# Patient Record
Sex: FEMALE | HISPANIC OR LATINO | Employment: FULL TIME | URBAN - METROPOLITAN AREA
[De-identification: names, ages, dates, MRNs, and addresses within clinical notes are randomized per-mention and may not be internally consistent; named-entity substitution may affect disease eponyms.]

---

## 2017-04-07 ENCOUNTER — HOSPITAL ENCOUNTER (OUTPATIENT)
Dept: LAB | Facility: MEDICAL CENTER | Age: 27
End: 2017-04-07
Attending: INTERNAL MEDICINE
Payer: COMMERCIAL

## 2017-04-07 ENCOUNTER — OFFICE VISIT (OUTPATIENT)
Dept: MEDICAL GROUP | Facility: PHYSICIAN GROUP | Age: 27
End: 2017-04-07
Payer: COMMERCIAL

## 2017-04-07 VITALS
RESPIRATION RATE: 16 BRPM | OXYGEN SATURATION: 96 % | HEART RATE: 72 BPM | HEIGHT: 62 IN | BODY MASS INDEX: 23.55 KG/M2 | DIASTOLIC BLOOD PRESSURE: 62 MMHG | WEIGHT: 128 LBS | TEMPERATURE: 99 F | SYSTOLIC BLOOD PRESSURE: 98 MMHG

## 2017-04-07 DIAGNOSIS — J06.9 URI, ACUTE: ICD-10-CM

## 2017-04-07 DIAGNOSIS — Z23 NEED FOR TETANUS BOOSTER: ICD-10-CM

## 2017-04-07 DIAGNOSIS — E55.9 VITAMIN D DEFICIENCY: ICD-10-CM

## 2017-04-07 DIAGNOSIS — Z00.00 ROUTINE HEALTH MAINTENANCE: ICD-10-CM

## 2017-04-07 DIAGNOSIS — J30.2 SEASONAL ALLERGIC RHINITIS, UNSPECIFIED ALLERGIC RHINITIS TRIGGER: ICD-10-CM

## 2017-04-07 DIAGNOSIS — Z78.9 USES CONTRACEPTION: ICD-10-CM

## 2017-04-07 LAB
25(OH)D3 SERPL-MCNC: 16 NG/ML (ref 30–100)
ALBUMIN SERPL BCP-MCNC: 3.9 G/DL (ref 3.2–4.9)
ALBUMIN/GLOB SERPL: 1.1 G/DL
ALP SERPL-CCNC: 90 U/L (ref 30–99)
ALT SERPL-CCNC: 10 U/L (ref 2–50)
ANION GAP SERPL CALC-SCNC: 6 MMOL/L (ref 0–11.9)
AST SERPL-CCNC: 13 U/L (ref 12–45)
BILIRUB SERPL-MCNC: 0.4 MG/DL (ref 0.1–1.5)
BUN SERPL-MCNC: 11 MG/DL (ref 8–22)
CALCIUM SERPL-MCNC: 9.3 MG/DL (ref 8.5–10.5)
CHLORIDE SERPL-SCNC: 103 MMOL/L (ref 96–112)
CHOLEST SERPL-MCNC: 181 MG/DL (ref 100–199)
CO2 SERPL-SCNC: 26 MMOL/L (ref 20–33)
CREAT SERPL-MCNC: 0.7 MG/DL (ref 0.5–1.4)
GFR SERPL CREATININE-BSD FRML MDRD: >60 ML/MIN/1.73 M 2
GLOBULIN SER CALC-MCNC: 3.4 G/DL (ref 1.9–3.5)
GLUCOSE SERPL-MCNC: 96 MG/DL (ref 65–99)
HDLC SERPL-MCNC: 52 MG/DL
LDLC SERPL CALC-MCNC: 90 MG/DL
POTASSIUM SERPL-SCNC: 3.9 MMOL/L (ref 3.6–5.5)
PROT SERPL-MCNC: 7.3 G/DL (ref 6–8.2)
SODIUM SERPL-SCNC: 135 MMOL/L (ref 135–145)
TRIGL SERPL-MCNC: 196 MG/DL (ref 0–149)

## 2017-04-07 PROCEDURE — 36415 COLL VENOUS BLD VENIPUNCTURE: CPT

## 2017-04-07 PROCEDURE — 90471 IMMUNIZATION ADMIN: CPT | Performed by: INTERNAL MEDICINE

## 2017-04-07 PROCEDURE — 99204 OFFICE O/P NEW MOD 45 MIN: CPT | Mod: 25 | Performed by: INTERNAL MEDICINE

## 2017-04-07 PROCEDURE — 80053 COMPREHEN METABOLIC PANEL: CPT

## 2017-04-07 PROCEDURE — 82306 VITAMIN D 25 HYDROXY: CPT

## 2017-04-07 PROCEDURE — 90715 TDAP VACCINE 7 YRS/> IM: CPT | Performed by: INTERNAL MEDICINE

## 2017-04-07 PROCEDURE — 80061 LIPID PANEL: CPT

## 2017-04-07 RX ORDER — CETIRIZINE HYDROCHLORIDE 10 MG/1
10 TABLET ORAL PRN
Status: ON HOLD | COMMUNITY
End: 2023-10-10

## 2017-04-07 ASSESSMENT — PATIENT HEALTH QUESTIONNAIRE - PHQ9: CLINICAL INTERPRETATION OF PHQ2 SCORE: 0

## 2017-04-07 NOTE — ASSESSMENT & PLAN NOTE
Lab Results   Component Value Date/Time    25-HYDROXY   VITAMIN D 25 27* 09/13/2014 12:55 PM    currently taking a multivitamin.

## 2017-04-07 NOTE — MR AVS SNAPSHOT
"        Henrietta Pepe   2017 8:05 AM   Office Visit   MRN: [id number]    Department:  [facility]   Dept Phone:  [phone]    Description:  Female : 1990   Provider:  Lisa Loving D.O.           Reason for Visit     [facility] Eze celis Pt.      Allergies as of 2017     No Known Allergies      You were diagnosed with     URI, acute   [169941]       Seasonal allergic rhinitis, unspecified allergic rhinitis trigger   [4122111]       Vitamin D deficiency   [5731159]       Need for tetanus booster   [315033]       Routine health maintenance   [562766]       Uses contraception   [999176]         Vital Signs     Blood Pressure Pulse Temperature Respirations Height Weight    98/62 mmHg 72 37.2 °C (99 °F) 16 1.562 m (5' 1.5\") 58.06 kg (128 lb)    Body Mass Index Oxygen Saturation Last Menstrual Period Breastfeeding? Smoking Status       23.80 kg/m2 96% 2017 No Never Smoker        Basic Information     Date Of Birth Sex Race Ethnicity Preferred Language    1990 Female  or   Origin (Croatian,Zambian,Cymro,North Korean, etc) English      Problem List              ICD-10-CM Priority Class Noted - Resolved    Seasonal allergies J30.2   2017 - Present    Vitamin D deficiency E55.9   2017 - Present    Routine health maintenance Z00.00   2017 - Present      Health Maintenance        Date Due Completion Dates    IMM DTaP/Tdap/Td Vaccine (1 - Tdap) 2007 ---    PAP SMEAR 2019            Current Immunizations     Tdap Vaccine 2017      Below and/or attached are the medications your provider expects you to take. Review all of your home medications and newly ordered medications with your provider and/or pharmacist. Follow medication instructions as directed by your provider and/or pharmacist. Please keep your medication list with you and share with your provider. Update the information when medications are discontinued, doses " are changed, or new medications (including over-the-counter products) are added; and carry medication information at all times in the event of emergency situations     Allergies:  No Known Allergies          Medications  Valid as of: April 07, 2017 -  9:23 AM    Generic Name Brand Name Tablet Size Instructions for use    Cetirizine HCl (Tab) ZYRTEC 10 MG Take 10 mg by mouth every day.        Multiple Vitamin (Tab) THERAGRAN  Take 1 Tab by mouth every day.        Norgestimate-Eth Estradiol   Take  by mouth.        .                 Medicines prescribed today were sent to:     [facility][id number] - [place] - [address] [place] [address]    Phone: [phone] Fax: [phone]    Open 24 Hours?: No      Medication refill instructions:       If your prescription bottle indicates you have medication refills left, it is not necessary to call your provider’s office. Please contact your pharmacy and they will refill your medication.    If your prescription bottle indicates you do not have any refills left, you may request refills at any time through one of the following ways: The online [employer] system (except Urgent Care), by calling your provider’s office, or by asking your pharmacy to contact your provider’s office with a refill request. Medication refills are processed only during regular business hours and may not be available until the next business day. Your provider may request additional information or to have a follow-up visit with you prior to refilling your medication.   *Please Note: Medication refills are assigned a new Rx number when refilled electronically. Your pharmacy may indicate that no refills were authorized even though a new prescription for the same medication is available at the pharmacy. Please request the medicine by name with the pharmacy before contacting your provider for a refill.        Your To Do List     Future Labs/Procedures Complete By Expires    COMP METABOLIC  PANEL  As directed 4/8/2018    LIPID PROFILE  As directed 4/8/2018    VITAMIN D,25 HYDROXY  As directed 4/8/2018      Instructions    sSinusitis, Adult  Sinusitis is redness, soreness, and inflammation of the paranasal sinuses. Paranasal sinuses are air pockets within the bones of your face. They are located beneath your eyes, in the middle of your forehead, and above your eyes. In healthy paranasal sinuses, mucus is able to drain out, and air is able to circulate through them by way of your nose. However, when your paranasal sinuses are inflamed, mucus and air can become trapped. This can allow bacteria and other germs to grow and cause infection.  Sinusitis can develop quickly and last only a short time (acute) or continue over a long period (chronic). Sinusitis that lasts for more than 12 weeks is considered chronic.  CAUSES  Causes of sinusitis include:  · Allergies.  · Structural abnormalities, such as displacement of the cartilage that separates your nostrils (deviated septum), which can decrease the air flow through your nose and sinuses and affect sinus drainage.  · Functional abnormalities, such as when the small hairs (cilia) that line your sinuses and help remove mucus do not work properly or are not present.  SIGNS AND SYMPTOMS  Symptoms of acute and chronic sinusitis are the same. The primary symptoms are pain and pressure around the affected sinuses. Other symptoms include:  · Upper toothache.  · Earache.  · Headache.  · Bad breath.  · Decreased sense of smell and taste.  · A cough, which worsens when you are lying flat.  · Fatigue.  · Fever.  · Thick drainage from your nose, which often is green and may contain pus (purulent).  · Swelling and warmth over the affected sinuses.  DIAGNOSIS  Your health care provider will perform a physical exam. During your exam, your health care provider may perform any of the following to help determine if you have acute sinusitis or chronic sinusitis:  · Look in your  nose for signs of abnormal growths in your nostrils (nasal polyps).  · Tap over the affected sinus to check for signs of infection.  · View the inside of your sinuses using an imaging device that has a light attached (endoscope).  If your health care provider suspects that you have chronic sinusitis, one or more of the following tests may be recommended:  · Allergy tests.  · Nasal culture. A sample of mucus is taken from your nose, sent to a lab, and screened for bacteria.  · Nasal cytology. A sample of mucus is taken from your nose and examined by your health care provider to determine if your sinusitis is related to an allergy.  TREATMENT  Most cases of acute sinusitis are related to a viral infection and will resolve on their own within 10 days. Sometimes, medicines are prescribed to help relieve symptoms of both acute and chronic sinusitis. These may include pain medicines, decongestants, nasal steroid sprays, or saline sprays.  However, for sinusitis related to a bacterial infection, your health care provider will prescribe antibiotic medicines. These are medicines that will help kill the bacteria causing the infection.  Rarely, sinusitis is caused by a fungal infection. In these cases, your health care provider will prescribe antifungal medicine.  For some cases of chronic sinusitis, surgery is needed. Generally, these are cases in which sinusitis recurs more than 3 times per year, despite other treatments.  HOME CARE INSTRUCTIONS  · Drink plenty of water. Water helps thin the mucus so your sinuses can drain more easily.  · Use a humidifier.  · Inhale steam 3-4 times a day (for example, sit in the bathroom with the shower running).  · Apply a warm, moist washcloth to your face 3-4 times a day, or as directed by your health care provider.  · Use saline nasal sprays to help moisten and clean your sinuses.  · Take medicines only as directed by your health care provider.  · If you were prescribed either an  antibiotic or antifungal medicine, finish it all even if you start to feel better.  SEEK IMMEDIATE MEDICAL CARE IF:  · You have increasing pain or severe headaches.  · You have nausea, vomiting, or drowsiness.  · You have swelling around your face.  · You have vision problems.  · You have a stiff neck.  · You have difficulty breathing.     This information is not intended to replace advice given to you by your health care provider. Make sure you discuss any questions you have with your health care provider.     Document Released: 12/18/2006 Document Revised: 01/08/2016 Document Reviewed: 01/01/2013  [employer] Interactive Patient Education ©2016 [employer]            [employer] Access Code: Activation code not generated  Current Authentic8 Status: Active

## 2017-04-07 NOTE — ASSESSMENT & PLAN NOTE
History of seasonal allergies that are worse during the season changes but generally also throughout the year. She takes over-the-counter Zyrtec which helps to manage her symptoms of rhinorrhea, sneezing.

## 2017-04-07 NOTE — PROGRESS NOTES
Henrietta Ppee is a 26 y.o. female here to establish care.  HPI:  Patient's previous primary care provider was Dr. Jw Davis MD. This is her initial visit with me.     Routine health maintenance  Patient reports history of HPV positive Pap smear around 2010-she has followed up with her gynecologist Dr. Carcamo and reports having a procedure done for this.. The last years her Pap smears have been normal.    Vitamin D deficiency  Lab Results   Component Value Date/Time    25-HYDROXY   VITAMIN D 25 27* 09/13/2014 12:55 PM    currently taking a multivitamin.    Seasonal allergies  History of seasonal allergies that are worse during the season changes but generally also throughout the year. She takes over-the-counter Zyrtec which helps to manage her symptoms of rhinorrhea, sneezing.    Patient also reports a history of 56 days of rhinorrhea and sneezing with thick green mucus discharge from nose. No sore throat or ear symptoms. No cough. No fever or chills. Sick contacts: Boyfriend with sinus infection treated with antibiotics. Patient has not used anything over-the-counter for treatment. Symptoms have not gotten worse but have also not improved. She also has a history of seasonal allergies as mentioned above.     Current medicines (including changes today)  Current Outpatient Prescriptions   Medication Sig Dispense Refill   • Norgestimate-Eth Estradiol (SPRINTEC 28 PO) Take  by mouth.     • multivitamin (THERAGRAN) Tab Take 1 Tab by mouth every day.     • cetirizine (ZYRTEC) 10 MG Tab Take 10 mg by mouth every day.       No current facility-administered medications for this visit.     She  has a past medical history of Abnormal Pap smear-followed by gyn (1/17/2011). She also has no past medical history of ASTHMA.  She  has past surgical history that includes tonsillectomy.  Social History   Substance Use Topics   • Smoking status: Never Smoker    • Smokeless tobacco: Never Used   • Alcohol Use: 0.0 oz/week      "0 Standard drinks or equivalent per week      Comment: during holidays     Social History     Social History Narrative     Family History   Problem Relation Age of Onset   • Cancer Father      pancreas   • Hyperlipidemia Father    • Diabetes Father    • Stroke Neg Hx    • Hypertension Neg Hx    • Hyperlipidemia Mother    • No Known Problems Sister    • No Known Problems Brother    • Heart Disease Paternal Uncle    • No Known Problems Sister    • No Known Problems Sister    • No Known Problems Daughter    • Cancer Paternal Uncle      unknown     Family Status   Relation Status Death Age   • Father  61     pancreatic cancer   • Mother Alive    • Sister Alive    • Brother Alive    • Paternal Uncle     • Sister Alive    • Sister Alive    • Daughter Alive    • Paternal Uncle         ROS  Constitutional: Negative for fever, chills, weight loss and malaise/fatigue.   HENT: Negative for ear pain, nosebleeds, congestion, sore throat and neck pain.    Eyes: Negative for blurred vision.   Respiratory: Negative for cough, sputum production, shortness of breath and wheezing.    Cardiovascular: Negative for chest pain, palpitations, orthopnea and leg swelling.   Gastrointestinal: Negative for heartburn, nausea, vomiting and abdominal pain.   Genitourinary: Negative for dysuria, urgency and frequency.   Musculoskeletal: Negative for myalgias, back pain and joint pain.   Skin: Negative for rash and itching.   Neurological: Negative for dizziness, tingling, tremors, sensory change, focal weakness and headaches.   Endo/Heme/Allergies: Does not bruise/bleed easily.   Psychiatric/Behavioral: Negative for depression, anxiety, or memory loss.     All other systems reviewed and are negative except as in HPI.     Objective:     Blood pressure 98/62, pulse 72, temperature 37.2 °C (99 °F), resp. rate 16, height 1.562 m (5' 1.5\"), weight 58.06 kg (128 lb), last menstrual period 2017, SpO2 96 %, not currently " breastfeeding. Body mass index is 23.8 kg/(m^2).  Physical Exam:  Constitutional: Alert, no distress.  Skin: Warm, dry, good turgor, no rashes in visible areas.  Eye: Equal, round and reactive, conjunctiva clear, lids normal.  Nose: Nares patent with thin mucus. Sinuses non tender over maxillary / frontal sinuses  Throat: Posterior pharynx with erythematous injection. No exudate.   Neck: supple, with non tender non-palpable anterior cervical, posterior cervical, preauricular, postauricular, submandibular LN.   Lungs:  clear to auscultation, no wheezing, no retraction, no stridor, good air exchange.   Abdomen: Soft, non-tender, no masses, no hepatosplenomegaly.  Psych: Alert and oriented x3, normal affect and mood.    Assessment and Plan:   The following treatment plan was discussed   1. URI, acute  May have sinus infection on top of allergic rhinitis. Likely self-limiting. Discussed conservative management and antibiotic resistance. Patient should RTO if symptoms not worsening or persisting.    2. Seasonal allergic rhinitis, unspecified allergic rhinitis trigger  Managed on over-the-counter Zyrtec.    3. Vitamin D deficiency  Currently multivitamin.  - VITAMIN D,25 HYDROXY; Future    4. Need for tetanus booster  - TDAP VACCINE =>8YO IM    5. Routine health maintenance  Annual influenza vaccination recommended. Follow up with gynecology for Pap smear screening exams.  - LIPID PROFILE; Future  - COMP METABOLIC PANEL; Future    Records requested from OB-Gyn.  Followup: Return in about 6 months (around 10/7/2017), or if symptoms worsen or fail to improve, for follow-up with PCP.         I have worked with consultants from the vendor as well as technical experts from [employer] to optimize the interface. I have made every reasonable attempt to correct obvious errors, but I expect that there are errors of grammar and possibly content that I did not discover before finalizing the note.

## 2017-04-07 NOTE — ASSESSMENT & PLAN NOTE
Patient reports history of HPV positive Pap smear around 2010-she has followed up with her gynecologist Dr. Carcamo and reports having a procedure done for this.. The last years her Pap smears have been normal.

## 2017-04-07 NOTE — PATIENT INSTRUCTIONS
sSinusitis, Adult  Sinusitis is redness, soreness, and inflammation of the paranasal sinuses. Paranasal sinuses are air pockets within the bones of your face. They are located beneath your eyes, in the middle of your forehead, and above your eyes. In healthy paranasal sinuses, mucus is able to drain out, and air is able to circulate through them by way of your nose. However, when your paranasal sinuses are inflamed, mucus and air can become trapped. This can allow bacteria and other germs to grow and cause infection.  Sinusitis can develop quickly and last only a short time (acute) or continue over a long period (chronic). Sinusitis that lasts for more than 12 weeks is considered chronic.  CAUSES  Causes of sinusitis include:  · Allergies.  · Structural abnormalities, such as displacement of the cartilage that separates your nostrils (deviated septum), which can decrease the air flow through your nose and sinuses and affect sinus drainage.  · Functional abnormalities, such as when the small hairs (cilia) that line your sinuses and help remove mucus do not work properly or are not present.  SIGNS AND SYMPTOMS  Symptoms of acute and chronic sinusitis are the same. The primary symptoms are pain and pressure around the affected sinuses. Other symptoms include:  · Upper toothache.  · Earache.  · Headache.  · Bad breath.  · Decreased sense of smell and taste.  · A cough, which worsens when you are lying flat.  · Fatigue.  · Fever.  · Thick drainage from your nose, which often is green and may contain pus (purulent).  · Swelling and warmth over the affected sinuses.  DIAGNOSIS  Your health care provider will perform a physical exam. During your exam, your health care provider may perform any of the following to help determine if you have acute sinusitis or chronic sinusitis:  · Look in your nose for signs of abnormal growths in your nostrils (nasal polyps).  · Tap over the affected sinus to check for signs of  infection.  · View the inside of your sinuses using an imaging device that has a light attached (endoscope).  If your health care provider suspects that you have chronic sinusitis, one or more of the following tests may be recommended:  · Allergy tests.  · Nasal culture. A sample of mucus is taken from your nose, sent to a lab, and screened for bacteria.  · Nasal cytology. A sample of mucus is taken from your nose and examined by your health care provider to determine if your sinusitis is related to an allergy.  TREATMENT  Most cases of acute sinusitis are related to a viral infection and will resolve on their own within 10 days. Sometimes, medicines are prescribed to help relieve symptoms of both acute and chronic sinusitis. These may include pain medicines, decongestants, nasal steroid sprays, or saline sprays.  However, for sinusitis related to a bacterial infection, your health care provider will prescribe antibiotic medicines. These are medicines that will help kill the bacteria causing the infection.  Rarely, sinusitis is caused by a fungal infection. In these cases, your health care provider will prescribe antifungal medicine.  For some cases of chronic sinusitis, surgery is needed. Generally, these are cases in which sinusitis recurs more than 3 times per year, despite other treatments.  HOME CARE INSTRUCTIONS  · Drink plenty of water. Water helps thin the mucus so your sinuses can drain more easily.  · Use a humidifier.  · Inhale steam 3-4 times a day (for example, sit in the bathroom with the shower running).  · Apply a warm, moist washcloth to your face 3-4 times a day, or as directed by your health care provider.  · Use saline nasal sprays to help moisten and clean your sinuses.  · Take medicines only as directed by your health care provider.  · If you were prescribed either an antibiotic or antifungal medicine, finish it all even if you start to feel better.  SEEK IMMEDIATE MEDICAL CARE IF:  · You have  increasing pain or severe headaches.  · You have nausea, vomiting, or drowsiness.  · You have swelling around your face.  · You have vision problems.  · You have a stiff neck.  · You have difficulty breathing.     This information is not intended to replace advice given to you by your health care provider. Make sure you discuss any questions you have with your health care provider.     Document Released: 12/18/2006 Document Revised: 01/08/2016 Document Reviewed: 01/01/2013  [employer] Interactive Patient Education ©2016 [employer]

## 2017-08-18 ENCOUNTER — HOSPITAL ENCOUNTER (OUTPATIENT)
Dept: LAB | Facility: MEDICAL CENTER | Age: 27
End: 2017-08-18
Attending: PHYSICIAN ASSISTANT
Payer: COMMERCIAL

## 2017-08-18 PROCEDURE — 36415 COLL VENOUS BLD VENIPUNCTURE: CPT

## 2017-08-18 PROCEDURE — 86480 TB TEST CELL IMMUN MEASURE: CPT

## 2017-08-18 PROCEDURE — 86787 VARICELLA-ZOSTER ANTIBODY: CPT

## 2017-08-21 LAB
M TB TUBERC IFN-G BLD QL: NEGATIVE
M TB TUBERC IFN-G/MITOGEN IGNF BLD: -0.02
M TB TUBERC IGNF/MITOGEN IGNF CONTROL: 66.34 [IU]/ML
MITOGEN IGNF BCKGRD COR BLD-ACNC: 0.05 [IU]/ML
VZV IGG SER IA-ACNC: POSITIVE

## 2017-10-11 ENCOUNTER — HOSPITAL ENCOUNTER (OUTPATIENT)
Facility: MEDICAL CENTER | Age: 27
End: 2017-10-11
Payer: COMMERCIAL

## 2017-10-11 LAB
25(OH)D3 SERPL-MCNC: 35 NG/ML (ref 30–100)
ALBUMIN SERPL BCP-MCNC: 4 G/DL (ref 3.2–4.9)
ALBUMIN/GLOB SERPL: 1.2 G/DL
ALP SERPL-CCNC: 85 U/L (ref 30–99)
ALT SERPL-CCNC: 10 U/L (ref 2–50)
ANION GAP SERPL CALC-SCNC: 8 MMOL/L (ref 0–11.9)
AST SERPL-CCNC: 13 U/L (ref 12–45)
BASOPHILS # BLD AUTO: 0.7 % (ref 0–1.8)
BASOPHILS # BLD: 0.07 K/UL (ref 0–0.12)
BILIRUB SERPL-MCNC: 0.3 MG/DL (ref 0.1–1.5)
BUN SERPL-MCNC: 12 MG/DL (ref 8–22)
CALCIUM SERPL-MCNC: 9.2 MG/DL (ref 8.5–10.5)
CHLORIDE SERPL-SCNC: 104 MMOL/L (ref 96–112)
CHOLEST SERPL-MCNC: 189 MG/DL (ref 100–199)
CO2 SERPL-SCNC: 24 MMOL/L (ref 20–33)
CREAT SERPL-MCNC: 0.63 MG/DL (ref 0.5–1.4)
EOSINOPHIL # BLD AUTO: 0.33 K/UL (ref 0–0.51)
EOSINOPHIL NFR BLD: 3.3 % (ref 0–6.9)
ERYTHROCYTE [DISTWIDTH] IN BLOOD BY AUTOMATED COUNT: 41.7 FL (ref 35.9–50)
GFR SERPL CREATININE-BSD FRML MDRD: >60 ML/MIN/1.73 M 2
GLOBULIN SER CALC-MCNC: 3.3 G/DL (ref 1.9–3.5)
GLUCOSE SERPL-MCNC: 91 MG/DL (ref 65–99)
HCT VFR BLD AUTO: 43.2 % (ref 37–47)
HDLC SERPL-MCNC: 52 MG/DL
HGB BLD-MCNC: 14 G/DL (ref 12–16)
IMM GRANULOCYTES # BLD AUTO: 0.03 K/UL (ref 0–0.11)
IMM GRANULOCYTES NFR BLD AUTO: 0.3 % (ref 0–0.9)
LDLC SERPL CALC-MCNC: 108 MG/DL
LYMPHOCYTES # BLD AUTO: 2.21 K/UL (ref 1–4.8)
LYMPHOCYTES NFR BLD: 21.9 % (ref 22–41)
MCH RBC QN AUTO: 29.5 PG (ref 27–33)
MCHC RBC AUTO-ENTMCNC: 32.4 G/DL (ref 33.6–35)
MCV RBC AUTO: 91.1 FL (ref 81.4–97.8)
MONOCYTES # BLD AUTO: 0.45 K/UL (ref 0–0.85)
MONOCYTES NFR BLD AUTO: 4.5 % (ref 0–13.4)
NEUTROPHILS # BLD AUTO: 7.02 K/UL (ref 2–7.15)
NEUTROPHILS NFR BLD: 69.3 % (ref 44–72)
NRBC # BLD AUTO: 0 K/UL
NRBC BLD AUTO-RTO: 0 /100 WBC
PLATELET # BLD AUTO: 279 K/UL (ref 164–446)
PMV BLD AUTO: 12.5 FL (ref 9–12.9)
POTASSIUM SERPL-SCNC: 3.9 MMOL/L (ref 3.6–5.5)
PROT SERPL-MCNC: 7.3 G/DL (ref 6–8.2)
RBC # BLD AUTO: 4.74 M/UL (ref 4.2–5.4)
SODIUM SERPL-SCNC: 136 MMOL/L (ref 135–145)
TRIGL SERPL-MCNC: 147 MG/DL (ref 0–149)
TSH SERPL DL<=0.005 MIU/L-ACNC: 0.73 UIU/ML (ref 0.3–3.7)
WBC # BLD AUTO: 10.1 K/UL (ref 4.8–10.8)

## 2018-03-01 ENCOUNTER — PATIENT OUTREACH (OUTPATIENT)
Dept: HEALTH INFORMATION MANAGEMENT | Facility: OTHER | Age: 28
End: 2018-03-01

## 2018-03-05 ENCOUNTER — HOSPITAL ENCOUNTER (OUTPATIENT)
Dept: LAB | Facility: MEDICAL CENTER | Age: 28
End: 2018-03-05
Attending: OBSTETRICS & GYNECOLOGY
Payer: COMMERCIAL

## 2018-03-05 LAB
AMBIGUOUS DTTM AMBI4: NORMAL
CYTOLOGY REG CYTOL: NORMAL

## 2018-03-05 PROCEDURE — 88175 CYTOPATH C/V AUTO FLUID REDO: CPT

## 2018-04-18 ENCOUNTER — OFFICE VISIT (OUTPATIENT)
Dept: URGENT CARE | Facility: PHYSICIAN GROUP | Age: 28
End: 2018-04-18
Payer: COMMERCIAL

## 2018-04-18 VITALS
BODY MASS INDEX: 22.08 KG/M2 | OXYGEN SATURATION: 95 % | SYSTOLIC BLOOD PRESSURE: 102 MMHG | WEIGHT: 120 LBS | HEIGHT: 62 IN | TEMPERATURE: 99 F | DIASTOLIC BLOOD PRESSURE: 64 MMHG | HEART RATE: 74 BPM

## 2018-04-18 DIAGNOSIS — H10.021 PINK EYE DISEASE, RIGHT: ICD-10-CM

## 2018-04-18 PROCEDURE — 99213 OFFICE O/P EST LOW 20 MIN: CPT | Performed by: NURSE PRACTITIONER

## 2018-04-18 RX ORDER — MOXIFLOXACIN 5 MG/ML
1 SOLUTION/ DROPS OPHTHALMIC 3 TIMES DAILY
Qty: 1 BOTTLE | Refills: 0 | Status: SHIPPED | OUTPATIENT
Start: 2018-04-18 | End: 2018-04-23

## 2018-04-18 ASSESSMENT — ENCOUNTER SYMPTOMS
NEUROLOGICAL NEGATIVE: 1
CARDIOVASCULAR NEGATIVE: 1
GASTROINTESTINAL NEGATIVE: 1
MUSCULOSKELETAL NEGATIVE: 1
RESPIRATORY NEGATIVE: 1
CONSTITUTIONAL NEGATIVE: 1
EYE REDNESS: 1
EYE DISCHARGE: 1

## 2018-04-18 NOTE — PROGRESS NOTES
"Subjective:      Henrietta Pepe is a 27 y.o. female who presents with Eye Problem (poss pink eye, Rt eye redness and green discharge, crusted shut this am )            HPI  Pt states she woke up with her eye closed shut. Right eye. Crusted over, yellow discharge  Yesterday had green discharge.  Pt wears contacts. Changes them monthly  Now wearing glasses  No pain  Just feels eye irritation  No itching  No blurry vision    Pt states she also has a mild cough--> worse in am, then gets better.  No fevers  No n/v no abd pain  No sob  No ear pain no ST  meds: none    No sick contacts  No smoking  Had flu vaccinethis year    lmp 4/16/2018    Past medical history reviewed including allergies and medications  Family history reviewed  Social history reviewed    Review of Systems   Constitutional: Negative.    HENT: Negative.    Eyes: Positive for discharge and redness.   Respiratory: Negative.    Cardiovascular: Negative.    Gastrointestinal: Negative.    Genitourinary: Negative.    Musculoskeletal: Negative.    Skin: Negative.    Neurological: Negative.           Objective:     /64   Pulse 74   Temp 37.2 °C (99 °F)   Ht 1.562 m (5' 1.5\")   Wt 54.4 kg (120 lb)   SpO2 95%   BMI 22.31 kg/m²      Physical Exam   Constitutional: She is oriented to person, place, and time. She appears well-developed and well-nourished.   HENT:   Head: Normocephalic and atraumatic.   Right Ear: External ear normal.   Left Ear: External ear normal.   Nose: Nose normal.   Mouth/Throat: Oropharynx is clear and moist.   Eyes: EOM and lids are normal. Pupils are equal, round, and reactive to light. Lids are everted and swept, no foreign bodies found. Right eye exhibits no hordeolum. Right conjunctiva is injected. Right conjunctiva has no hemorrhage. No scleral icterus.   Scant amount of dried discharge to rt inner cantus   Neck: Normal range of motion.   Cardiovascular: Normal rate and regular rhythm.    Pulmonary/Chest: Effort normal " and breath sounds normal.   Musculoskeletal: Normal range of motion.   Neurological: She is alert and oriented to person, place, and time.   Skin: Skin is warm and dry. Capillary refill takes less than 2 seconds.   Psychiatric: She has a normal mood and affect. Her behavior is normal. Judgment and thought content normal.               Assessment/Plan:     1. Pink eye disease, right  moxifloxacin (VIGAMOX) 0.5 % Solution     Discussed pink eye and handout given  Start Vigamox  Do not wear contacts until infection is resolved  For cough stay hydrated and may use OTC robitussin  Monitor for worse symptoms and f/u prn sooner

## 2018-05-31 ENCOUNTER — APPOINTMENT (OUTPATIENT)
Dept: MEDICAL GROUP | Facility: PHYSICIAN GROUP | Age: 28
End: 2018-05-31
Payer: COMMERCIAL

## 2018-06-15 ENCOUNTER — OFFICE VISIT (OUTPATIENT)
Dept: MEDICAL GROUP | Facility: MEDICAL CENTER | Age: 28
End: 2018-06-15
Payer: COMMERCIAL

## 2018-06-15 VITALS
OXYGEN SATURATION: 97 % | TEMPERATURE: 98.7 F | RESPIRATION RATE: 14 BRPM | DIASTOLIC BLOOD PRESSURE: 60 MMHG | HEART RATE: 76 BPM | BODY MASS INDEX: 24.15 KG/M2 | WEIGHT: 123 LBS | HEIGHT: 60 IN | SYSTOLIC BLOOD PRESSURE: 94 MMHG

## 2018-06-15 DIAGNOSIS — H53.8 BLURRY VISION: ICD-10-CM

## 2018-06-15 DIAGNOSIS — E78.5 DYSLIPIDEMIA: ICD-10-CM

## 2018-06-15 DIAGNOSIS — Z00.00 HEALTHCARE MAINTENANCE: ICD-10-CM

## 2018-06-15 DIAGNOSIS — L98.9 SKIN LESION: ICD-10-CM

## 2018-06-15 PROBLEM — E55.9 VITAMIN D DEFICIENCY: Status: RESOLVED | Noted: 2017-04-07 | Resolved: 2018-06-15

## 2018-06-15 PROCEDURE — 99214 OFFICE O/P EST MOD 30 MIN: CPT | Performed by: FAMILY MEDICINE

## 2018-06-15 ASSESSMENT — PATIENT HEALTH QUESTIONNAIRE - PHQ9: CLINICAL INTERPRETATION OF PHQ2 SCORE: 0

## 2018-06-15 NOTE — PROGRESS NOTES
[facility]  Progress Note  Established Patient    Subjective:   Henrietta Pepe is a 27 y.o. female here today with a chief complaint of moles. The patient is alone.     Healthcare maintenance  Lipids: Done in 2017, cholesterol slightly up.   Fasting Glucose: Done in 2017 and normal.  Pap: 2018 pap without cotesting normal.     Tdap: given 2017 and normal.     Blurry vision  The patient describes intermittent blurriness in the right eye, worse when she uses contact lenses. She has tried some treatment for pinkeye and doesn't think this helped much.    Dyslipidemia  The patient has mild dyslipidemia identified on 2017 labs. She understands the importance of diet and exercise.    Skin lesion  The patient describes 3 new moles that are concerning to her. First is on her right medial ankle. It is not bothersome, however, it is new for the past year. She just describes two left anterior thigh moles. These are also not bothersome to her, however, there are also new over the past year.      Current Outpatient Prescriptions on File Prior to Visit   Medication Sig Dispense Refill   • Norgestimate-Eth Estradiol (SPRINTEC 28 PO) Take  by mouth.     • cetirizine (ZYRTEC) 10 MG Tab Take 10 mg by mouth as needed.       No current facility-administered medications on file prior to visit.        Past Medical History:   Diagnosis Date   • Abnormal Pap smear-followed by gyn 1/17/2011       Allergies: Patient has no known allergies.    Surgical History:  has a past surgical history that includes tonsillectomy.    Family History: family history includes Cancer in her father and paternal uncle; Diabetes in her father; Heart Disease in her paternal uncle; Hyperlipidemia in her father and mother; No Known Problems in her brother, daughter, sister, sister, and sister.    Social History:  reports that she has never smoked. She has never used smokeless tobacco. She reports that she drinks alcohol. She reports that she does not  use drugs.    ROS: no CP or SOB.        Objective:     Vitals:    06/15/18 1058   BP: (!) 94/60   Pulse: 76   Resp: 14   Temp: 37.1 °C (98.7 °F)   SpO2: 97%   Weight: 55.8 kg (123 lb)   Height: 1.524 m (5')       Physical Exam:  General: alert in no apparent distress.   Cardio: regular rate and rhythm, no murmurs, rubs or gallops.   Resp: CTAB no w/r/r.   Skin: Patient has 2 anterior thigh moles and one medial ankle mole. Each of these measures approximately 0.2 cm in size. The borders are regular, there is no color variation.        Assessment and Plan:     1. Blurry vision  - REFERRAL TO OPTOMETRY    2. Skin lesion  Considering that these are new and changing moles over the past year, biopsy may be considered. Otherwise, there are no other concerning features.  - return for re-evaluation and consideration of bx.     3. Healthcare maintenance  - see HPI.     4. Dyslipidemia  - diet and exercise.         Followup: Return in about 4 weeks (around 7/13/2018), or if symptoms worsen or fail to improve.

## 2018-06-15 NOTE — ASSESSMENT & PLAN NOTE
The patient describes intermittent blurriness in the right eye, worse when she uses contact lenses. She has tried some treatment for pinkeye and doesn't think this helped much.

## 2018-06-15 NOTE — ASSESSMENT & PLAN NOTE
Lipids: Done in 2017, cholesterol slightly up.   Fasting Glucose: Done in 2017 and normal.  Pap: 2018 pap without cotesting normal.     Tdap: given 2017 and normal.

## 2018-06-15 NOTE — ASSESSMENT & PLAN NOTE
The patient has mild dyslipidemia identified on 2017 labs. She understands the importance of diet and exercise.

## 2018-06-15 NOTE — ASSESSMENT & PLAN NOTE
The patient describes 3 new moles that are concerning to her. First is on her right medial ankle. It is not bothersome, however, it is new for the past year. She just describes two left anterior thigh moles. These are also not bothersome to her, however, there are also new over the past year.

## 2018-06-26 ENCOUNTER — HOSPITAL ENCOUNTER (OUTPATIENT)
Facility: MEDICAL CENTER | Age: 28
End: 2018-06-26
Attending: FAMILY MEDICINE
Payer: COMMERCIAL

## 2018-06-26 ENCOUNTER — OFFICE VISIT (OUTPATIENT)
Dept: MEDICAL GROUP | Facility: MEDICAL CENTER | Age: 28
End: 2018-06-26
Payer: COMMERCIAL

## 2018-06-26 VITALS
TEMPERATURE: 97 F | SYSTOLIC BLOOD PRESSURE: 100 MMHG | HEIGHT: 60 IN | WEIGHT: 122.58 LBS | HEART RATE: 74 BPM | BODY MASS INDEX: 24.07 KG/M2 | DIASTOLIC BLOOD PRESSURE: 68 MMHG | RESPIRATION RATE: 18 BRPM | OXYGEN SATURATION: 98 %

## 2018-06-26 DIAGNOSIS — L98.9 SKIN LESION: ICD-10-CM

## 2018-06-26 DIAGNOSIS — D22.9 JUNCTIONAL MELANOCYTIC NEVUS OF SKIN: ICD-10-CM

## 2018-06-26 PROCEDURE — 11400 EXC TR-EXT B9+MARG 0.5 CM<: CPT | Performed by: FAMILY MEDICINE

## 2018-06-26 PROCEDURE — 88305 TISSUE EXAM BY PATHOLOGIST: CPT

## 2018-06-26 NOTE — PROGRESS NOTES
[facility]  Progress Note  Established Patient    Subjective:   Henrietta Pepe is a 27 y.o. female here today with a chief complaint of a skin lesion. The patient is alone.     Skin lesion  The patient continues to describe 3 new moles that are concerning to her. She states that the first is on her right medial ankle. It is not bothersome, however, it is new for the past year and she would like it biopsied. She also describes two left anterior thigh moles. These are also not bothersome to her, however, there are also new over the past year. The patient would just like to monitor these.      Current Outpatient Prescriptions on File Prior to Visit   Medication Sig Dispense Refill   • Cholecalciferol (VITAMIN D PO) Take  by mouth.     • Norgestimate-Eth Estradiol (SPRINTEC 28 PO) Take  by mouth.     • cetirizine (ZYRTEC) 10 MG Tab Take 10 mg by mouth as needed.       No current facility-administered medications on file prior to visit.        Past Medical History:   Diagnosis Date   • Abnormal Pap smear-followed by gyn 1/17/2011       Allergies: Patient has no known allergies.    Surgical History:  has a past surgical history that includes tonsillectomy.    Family History: family history includes Cancer in her father and paternal uncle; Diabetes in her father; Heart Disease in her paternal uncle; Hyperlipidemia in her father and mother; No Known Problems in her brother, daughter, sister, sister, and sister.    Social History:  reports that she has never smoked. She has never used smokeless tobacco. She reports that she drinks alcohol. She reports that she does not use drugs.    ROS: no fever.        Objective:     Vitals:    06/26/18 1614   BP: 100/68   Pulse: 74   Resp: 18   Temp: 36.1 °C (97 °F)   SpO2: 98%   Weight: 55.6 kg (122 lb 9.2 oz)   Height: 1.524 m (5')       Physical Exam:  General: alert in no apparent distress.   0.2 cm dark, right medial ankle skin lesion.     Shave Biopsy Procedure  Note:  Informed consent was obtained. The area was cleansed with betadine and 1% lidocaine with epinephrine was used for local anesthesia. A shave biopsy was taken. Minimal bleeding was encountered and hemostasis was achieved with pressure. There were no complications. The area was dressed with bacitracin and a Band-Aid. Aftercare was discussed with the patient.      Assessment and Plan:     1. Skin lesion  Changing over past year concerning for malignancy.   - shave biopsy (see procedure note above) of ankle lesion.   - patient will monitor left anterior thigh lesions and call with any changes.     7/2/18 Pathology Update:   A. Right medial ankle:         Junctional melanocytic nevus with mild architectural atypia          appears completely excised.         Dermis demonstrating mild chronic inflammation and          postinflammatory pigment incontinence.    Followup: Return in about 1 year (around 6/26/2019), or if symptoms worsen or fail to improve, for Wellness Visit, Long.

## 2018-06-26 NOTE — ASSESSMENT & PLAN NOTE
The patient continues to describe 3 new moles that are concerning to her. She states that the first is on her right medial ankle. It is not bothersome, however, it is new for the past year and she would like it biopsied. She also describes two left anterior thigh moles. These are also not bothersome to her, however, there are also new over the past year. The patient would just like to monitor these.

## 2018-07-02 PROBLEM — D22.9: Status: ACTIVE | Noted: 2018-07-02

## 2018-07-27 ENCOUNTER — TELEPHONE (OUTPATIENT)
Dept: MEDICAL GROUP | Facility: MEDICAL CENTER | Age: 28
End: 2018-07-27

## 2018-07-27 DIAGNOSIS — Z11.1 SCREENING FOR TUBERCULOSIS: ICD-10-CM

## 2018-07-27 NOTE — TELEPHONE ENCOUNTER
Order placed. Please confirm with the patient that she doesn't have any symptoms such as fever, cough, night sweats and that this is just a screening test. If the patient doesn't have symptoms, she may get the lab done. If she has any symptoms, she needs to be seen in urgent care today.

## 2018-08-01 ENCOUNTER — HOSPITAL ENCOUNTER (OUTPATIENT)
Dept: LAB | Facility: MEDICAL CENTER | Age: 28
End: 2018-08-01
Attending: FAMILY MEDICINE
Payer: COMMERCIAL

## 2018-08-01 DIAGNOSIS — Z11.1 SCREENING FOR TUBERCULOSIS: ICD-10-CM

## 2018-08-01 PROCEDURE — 36415 COLL VENOUS BLD VENIPUNCTURE: CPT

## 2018-08-01 PROCEDURE — 86480 TB TEST CELL IMMUN MEASURE: CPT

## 2018-08-03 LAB
M TB TUBERC IFN-G BLD QL: NEGATIVE
M TB TUBERC IFN-G/MITOGEN IGNF BLD: -0.01
M TB TUBERC IGNF/MITOGEN IGNF CONTROL: 38.4 [IU]/ML
MITOGEN IGNF BCKGRD COR BLD-ACNC: 0.03 [IU]/ML

## 2020-12-22 DIAGNOSIS — Z23 NEED FOR VACCINATION: ICD-10-CM

## 2021-04-19 ENCOUNTER — HOSPITAL ENCOUNTER (OUTPATIENT)
Dept: HOSPITAL 8 - CVU | Age: 31
Discharge: HOME | End: 2021-04-19
Attending: INTERNAL MEDICINE
Payer: COMMERCIAL

## 2021-04-19 DIAGNOSIS — R00.2: Primary | ICD-10-CM

## 2021-04-19 PROCEDURE — 93356 MYOCRD STRAIN IMG SPCKL TRCK: CPT

## 2021-04-19 PROCEDURE — 93306 TTE W/DOPPLER COMPLETE: CPT

## 2021-04-26 ENCOUNTER — HOSPITAL ENCOUNTER (INPATIENT)
Dept: HOSPITAL 8 - LDOP | Age: 31
LOS: 1 days | Discharge: HOME | End: 2021-04-27
Attending: OBSTETRICS & GYNECOLOGY | Admitting: OBSTETRICS & GYNECOLOGY
Payer: COMMERCIAL

## 2021-04-26 VITALS — DIASTOLIC BLOOD PRESSURE: 66 MMHG | SYSTOLIC BLOOD PRESSURE: 102 MMHG

## 2021-04-26 VITALS — SYSTOLIC BLOOD PRESSURE: 93 MMHG | DIASTOLIC BLOOD PRESSURE: 54 MMHG

## 2021-04-26 VITALS — DIASTOLIC BLOOD PRESSURE: 67 MMHG | SYSTOLIC BLOOD PRESSURE: 105 MMHG

## 2021-04-26 VITALS — WEIGHT: 140.21 LBS | HEIGHT: 60 IN | BODY MASS INDEX: 27.53 KG/M2

## 2021-04-26 DIAGNOSIS — Z20.822: ICD-10-CM

## 2021-04-26 DIAGNOSIS — Z3A.40: ICD-10-CM

## 2021-04-26 DIAGNOSIS — Z83.3: ICD-10-CM

## 2021-04-26 DIAGNOSIS — Z82.49: ICD-10-CM

## 2021-04-26 LAB
BASOPHILS # BLD AUTO: 0 X10^3/UL (ref 0–0.1)
BASOPHILS # BLD AUTO: 0 X10^3/UL (ref 0–0.1)
BASOPHILS NFR BLD AUTO: 0 % (ref 0–1)
BASOPHILS NFR BLD AUTO: 0 % (ref 0–1)
EOSINOPHIL # BLD AUTO: 0 X10^3/UL (ref 0–0.4)
EOSINOPHIL # BLD AUTO: 0.1 X10^3/UL (ref 0–0.4)
EOSINOPHIL NFR BLD AUTO: 0 % (ref 1–7)
EOSINOPHIL NFR BLD AUTO: 1 % (ref 1–7)
ERYTHROCYTE [DISTWIDTH] IN BLOOD BY AUTOMATED COUNT: 14.1 % (ref 9.6–15.2)
ERYTHROCYTE [DISTWIDTH] IN BLOOD BY AUTOMATED COUNT: 14.1 % (ref 9.6–15.2)
LYMPHOCYTES # BLD AUTO: 1.5 X10^3/UL (ref 1–3.4)
LYMPHOCYTES # BLD AUTO: 2.2 X10^3/UL (ref 1–3.4)
LYMPHOCYTES NFR BLD AUTO: 10 % (ref 22–44)
LYMPHOCYTES NFR BLD AUTO: 21 % (ref 22–44)
MCH RBC QN AUTO: 32.2 PG (ref 27–34.8)
MCH RBC QN AUTO: 32.3 PG (ref 27–34.8)
MCHC RBC AUTO-ENTMCNC: 33.7 G/DL (ref 32.4–35.8)
MCHC RBC AUTO-ENTMCNC: 34 G/DL (ref 32.4–35.8)
MD: NO
MD: NO
MONOCYTES # BLD AUTO: 0.6 X10^3/UL (ref 0.2–0.8)
MONOCYTES # BLD AUTO: 0.9 X10^3/UL (ref 0.2–0.8)
MONOCYTES NFR BLD AUTO: 6 % (ref 2–9)
MONOCYTES NFR BLD AUTO: 6 % (ref 2–9)
NEUTROPHILS # BLD AUTO: 12.8 X10^3/UL (ref 1.8–6.8)
NEUTROPHILS # BLD AUTO: 7.7 X10^3/UL (ref 1.8–6.8)
NEUTROPHILS NFR BLD AUTO: 73 % (ref 42–75)
NEUTROPHILS NFR BLD AUTO: 84 % (ref 42–75)
PLATELET # BLD AUTO: 142 X10^3/UL (ref 130–400)
PLATELET # BLD AUTO: 168 X10^3/UL (ref 130–400)
PMV BLD AUTO: 10.3 FL (ref 7.4–10.4)
PMV BLD AUTO: 10.7 FL (ref 7.4–10.4)
RBC # BLD AUTO: 3.52 X10^6/UL (ref 3.82–5.3)
RBC # BLD AUTO: 4.07 X10^6/UL (ref 3.82–5.3)

## 2021-04-26 PROCEDURE — 86850 RBC ANTIBODY SCREEN: CPT

## 2021-04-26 PROCEDURE — 86592 SYPHILIS TEST NON-TREP QUAL: CPT

## 2021-04-26 PROCEDURE — 36415 COLL VENOUS BLD VENIPUNCTURE: CPT

## 2021-04-26 PROCEDURE — 86900 BLOOD TYPING SEROLOGIC ABO: CPT

## 2021-04-26 PROCEDURE — 85025 COMPLETE CBC W/AUTO DIFF WBC: CPT

## 2021-04-26 PROCEDURE — 87635 SARS-COV-2 COVID-19 AMP PRB: CPT

## 2021-04-26 RX ADMIN — Medication SCH MLS/HR: at 23:14

## 2021-04-26 RX ADMIN — SODIUM CHLORIDE, SODIUM LACTATE, POTASSIUM CHLORIDE, AND CALCIUM CHLORIDE SCH MLS/HR: .6; .31; .03; .02 INJECTION, SOLUTION INTRAVENOUS at 02:30

## 2021-04-26 RX ADMIN — SODIUM CHLORIDE, SODIUM LACTATE, POTASSIUM CHLORIDE, AND CALCIUM CHLORIDE SCH MLS/HR: .6; .31; .03; .02 INJECTION, SOLUTION INTRAVENOUS at 10:30

## 2021-04-26 RX ADMIN — IBUPROFEN PRN MG: 600 TABLET ORAL at 12:10

## 2021-04-26 RX ADMIN — Medication SCH MLS/HR: at 12:11

## 2021-04-26 RX ADMIN — HYDROCODONE BITARTRATE AND ACETAMINOPHEN PRN TAB: 5; 325 TABLET ORAL at 20:09

## 2021-04-26 RX ADMIN — Medication SCH MLS/HR: at 20:30

## 2021-04-26 RX ADMIN — IBUPROFEN PRN MG: 600 TABLET ORAL at 18:23

## 2021-04-26 RX ADMIN — SODIUM CHLORIDE, SODIUM LACTATE, POTASSIUM CHLORIDE, AND CALCIUM CHLORIDE SCH MLS/HR: .6; .31; .03; .02 INJECTION, SOLUTION INTRAVENOUS at 18:30

## 2021-04-26 RX ADMIN — SODIUM CHLORIDE, SODIUM LACTATE, POTASSIUM CHLORIDE, AND CALCIUM CHLORIDE SCH MLS/HR: .6; .31; .03; .02 INJECTION, SOLUTION INTRAVENOUS at 23:14

## 2021-04-27 VITALS — SYSTOLIC BLOOD PRESSURE: 94 MMHG | DIASTOLIC BLOOD PRESSURE: 61 MMHG

## 2021-04-27 VITALS — SYSTOLIC BLOOD PRESSURE: 93 MMHG | DIASTOLIC BLOOD PRESSURE: 60 MMHG

## 2021-04-27 VITALS — DIASTOLIC BLOOD PRESSURE: 64 MMHG | SYSTOLIC BLOOD PRESSURE: 97 MMHG

## 2021-04-27 PROCEDURE — 3E0R3BZ INTRODUCTION OF ANESTHETIC AGENT INTO SPINAL CANAL, PERCUTANEOUS APPROACH: ICD-10-PCS | Performed by: OBSTETRICS & GYNECOLOGY

## 2021-04-27 PROCEDURE — 00HU33Z INSERTION OF INFUSION DEVICE INTO SPINAL CANAL, PERCUTANEOUS APPROACH: ICD-10-PCS | Performed by: OBSTETRICS & GYNECOLOGY

## 2021-04-27 RX ADMIN — HYDROCODONE BITARTRATE AND ACETAMINOPHEN PRN TAB: 5; 325 TABLET ORAL at 17:01

## 2021-04-27 RX ADMIN — HYDROCODONE BITARTRATE AND ACETAMINOPHEN PRN TAB: 5; 325 TABLET ORAL at 05:01

## 2021-04-27 RX ADMIN — HYDROCODONE BITARTRATE AND ACETAMINOPHEN PRN TAB: 5; 325 TABLET ORAL at 00:11

## 2021-04-27 RX ADMIN — IBUPROFEN PRN MG: 600 TABLET ORAL at 12:15

## 2021-04-27 RX ADMIN — HYDROCODONE BITARTRATE AND ACETAMINOPHEN PRN TAB: 5; 325 TABLET ORAL at 12:14

## 2021-04-27 RX ADMIN — Medication SCH MLS/HR: at 04:37

## 2021-04-27 RX ADMIN — IBUPROFEN PRN MG: 600 TABLET ORAL at 02:59

## 2023-10-09 ENCOUNTER — HOSPITAL ENCOUNTER (INPATIENT)
Facility: MEDICAL CENTER | Age: 33
LOS: 1 days | End: 2023-10-10
Attending: OBSTETRICS & GYNECOLOGY | Admitting: OBSTETRICS & GYNECOLOGY
Payer: COMMERCIAL

## 2023-10-09 DIAGNOSIS — Z91.89 AT RISK FOR BREASTFEEDING DIFFICULTY: ICD-10-CM

## 2023-10-09 LAB
BASOPHILS # BLD AUTO: 0.2 % (ref 0–1.8)
BASOPHILS # BLD: 0.02 K/UL (ref 0–0.12)
EOSINOPHIL # BLD AUTO: 0.01 K/UL (ref 0–0.51)
EOSINOPHIL NFR BLD: 0.1 % (ref 0–6.9)
ERYTHROCYTE [DISTWIDTH] IN BLOOD BY AUTOMATED COUNT: 45.8 FL (ref 35.9–50)
ERYTHROCYTE [DISTWIDTH] IN BLOOD BY AUTOMATED COUNT: 47.5 FL (ref 35.9–50)
HCT VFR BLD AUTO: 34.3 % (ref 37–47)
HCT VFR BLD AUTO: 42 % (ref 37–47)
HGB BLD-MCNC: 11.3 G/DL (ref 12–16)
HGB BLD-MCNC: 13.9 G/DL (ref 12–16)
HOLDING TUBE BB 8507: NORMAL
IMM GRANULOCYTES # BLD AUTO: 0.08 K/UL (ref 0–0.11)
IMM GRANULOCYTES NFR BLD AUTO: 0.7 % (ref 0–0.9)
LYMPHOCYTES # BLD AUTO: 1.15 K/UL (ref 1–4.8)
LYMPHOCYTES NFR BLD: 9.4 % (ref 22–41)
MCH RBC QN AUTO: 29.6 PG (ref 27–33)
MCH RBC QN AUTO: 30 PG (ref 27–33)
MCHC RBC AUTO-ENTMCNC: 32.9 G/DL (ref 32.2–35.5)
MCHC RBC AUTO-ENTMCNC: 33.1 G/DL (ref 32.2–35.5)
MCV RBC AUTO: 89.8 FL (ref 81.4–97.8)
MCV RBC AUTO: 90.5 FL (ref 81.4–97.8)
MONOCYTES # BLD AUTO: 0.53 K/UL (ref 0–0.85)
MONOCYTES NFR BLD AUTO: 4.4 % (ref 0–13.4)
NEUTROPHILS # BLD AUTO: 10.39 K/UL (ref 1.82–7.42)
NEUTROPHILS NFR BLD: 85.2 % (ref 44–72)
NRBC # BLD AUTO: 0 K/UL
NRBC BLD-RTO: 0 /100 WBC (ref 0–0.2)
PLATELET # BLD AUTO: 177 K/UL (ref 164–446)
PLATELET # BLD AUTO: 186 K/UL (ref 164–446)
PMV BLD AUTO: 12 FL (ref 9–12.9)
PMV BLD AUTO: 12.5 FL (ref 9–12.9)
RBC # BLD AUTO: 3.82 M/UL (ref 4.2–5.4)
RBC # BLD AUTO: 4.64 M/UL (ref 4.2–5.4)
T PALLIDUM AB SER QL IA: NORMAL
WBC # BLD AUTO: 11.8 K/UL (ref 4.8–10.8)
WBC # BLD AUTO: 12.2 K/UL (ref 4.8–10.8)

## 2023-10-09 PROCEDURE — 85027 COMPLETE CBC AUTOMATED: CPT

## 2023-10-09 PROCEDURE — 770002 HCHG ROOM/CARE - OB PRIVATE (112)

## 2023-10-09 PROCEDURE — 59409 OBSTETRICAL CARE: CPT

## 2023-10-09 PROCEDURE — 700105 HCHG RX REV CODE 258

## 2023-10-09 PROCEDURE — 700105 HCHG RX REV CODE 258: Performed by: OBSTETRICS & GYNECOLOGY

## 2023-10-09 PROCEDURE — 304965 HCHG RECOVERY SERVICES

## 2023-10-09 PROCEDURE — 700111 HCHG RX REV CODE 636 W/ 250 OVERRIDE (IP): Mod: JZ

## 2023-10-09 PROCEDURE — 36415 COLL VENOUS BLD VENIPUNCTURE: CPT

## 2023-10-09 PROCEDURE — 86780 TREPONEMA PALLIDUM: CPT

## 2023-10-09 PROCEDURE — 700111 HCHG RX REV CODE 636 W/ 250 OVERRIDE (IP): Mod: JZ | Performed by: OBSTETRICS & GYNECOLOGY

## 2023-10-09 PROCEDURE — 85025 COMPLETE CBC W/AUTO DIFF WBC: CPT

## 2023-10-09 RX ORDER — METHYLERGONOVINE MALEATE 0.2 MG/ML
0.2 INJECTION INTRAVENOUS
Status: DISCONTINUED | OUTPATIENT
Start: 2023-10-09 | End: 2023-10-09 | Stop reason: HOSPADM

## 2023-10-09 RX ORDER — DOCUSATE SODIUM 100 MG/1
100 CAPSULE, LIQUID FILLED ORAL 2 TIMES DAILY PRN
Status: DISCONTINUED | OUTPATIENT
Start: 2023-10-09 | End: 2023-10-10 | Stop reason: HOSPADM

## 2023-10-09 RX ORDER — ACETAMINOPHEN 500 MG
1000 TABLET ORAL
Status: DISCONTINUED | OUTPATIENT
Start: 2023-10-09 | End: 2023-10-09 | Stop reason: HOSPADM

## 2023-10-09 RX ORDER — IBUPROFEN 800 MG/1
800 TABLET ORAL EVERY 8 HOURS PRN
Status: DISCONTINUED | OUTPATIENT
Start: 2023-10-09 | End: 2023-10-10 | Stop reason: HOSPADM

## 2023-10-09 RX ORDER — LIDOCAINE HYDROCHLORIDE 10 MG/ML
20 INJECTION, SOLUTION INFILTRATION; PERINEURAL
Status: DISCONTINUED | OUTPATIENT
Start: 2023-10-09 | End: 2023-10-09 | Stop reason: HOSPADM

## 2023-10-09 RX ORDER — MISOPROSTOL 200 UG/1
800 TABLET ORAL
Status: DISCONTINUED | OUTPATIENT
Start: 2023-10-09 | End: 2023-10-10 | Stop reason: HOSPADM

## 2023-10-09 RX ORDER — SIMETHICONE 125 MG
125 TABLET,CHEWABLE ORAL 4 TIMES DAILY PRN
Status: DISCONTINUED | OUTPATIENT
Start: 2023-10-09 | End: 2023-10-10 | Stop reason: HOSPADM

## 2023-10-09 RX ORDER — OXYTOCIN 10 [USP'U]/ML
10 INJECTION, SOLUTION INTRAMUSCULAR; INTRAVENOUS
Status: DISCONTINUED | OUTPATIENT
Start: 2023-10-09 | End: 2023-10-09 | Stop reason: HOSPADM

## 2023-10-09 RX ORDER — TERBUTALINE SULFATE 1 MG/ML
0.25 INJECTION, SOLUTION SUBCUTANEOUS
Status: DISCONTINUED | OUTPATIENT
Start: 2023-10-09 | End: 2023-10-09 | Stop reason: HOSPADM

## 2023-10-09 RX ORDER — ACETAMINOPHEN 500 MG
1000 TABLET ORAL EVERY 6 HOURS PRN
Status: DISCONTINUED | OUTPATIENT
Start: 2023-10-09 | End: 2023-10-10 | Stop reason: HOSPADM

## 2023-10-09 RX ORDER — OXYCODONE HYDROCHLORIDE 5 MG/1
5 TABLET ORAL
Status: DISCONTINUED | OUTPATIENT
Start: 2023-10-09 | End: 2023-10-09 | Stop reason: HOSPADM

## 2023-10-09 RX ORDER — DEXTROSE, SODIUM CHLORIDE, SODIUM LACTATE, POTASSIUM CHLORIDE, AND CALCIUM CHLORIDE 5; .6; .31; .03; .02 G/100ML; G/100ML; G/100ML; G/100ML; G/100ML
INJECTION, SOLUTION INTRAVENOUS CONTINUOUS
Status: DISCONTINUED | OUTPATIENT
Start: 2023-10-09 | End: 2023-10-10

## 2023-10-09 RX ORDER — SODIUM CHLORIDE, SODIUM LACTATE, POTASSIUM CHLORIDE, CALCIUM CHLORIDE 600; 310; 30; 20 MG/100ML; MG/100ML; MG/100ML; MG/100ML
INJECTION, SOLUTION INTRAVENOUS PRN
Status: DISCONTINUED | OUTPATIENT
Start: 2023-10-09 | End: 2023-10-10 | Stop reason: HOSPADM

## 2023-10-09 RX ORDER — ONDANSETRON 2 MG/ML
4 INJECTION INTRAMUSCULAR; INTRAVENOUS EVERY 6 HOURS PRN
Status: DISCONTINUED | OUTPATIENT
Start: 2023-10-09 | End: 2023-10-09 | Stop reason: HOSPADM

## 2023-10-09 RX ORDER — MISOPROSTOL 200 UG/1
800 TABLET ORAL
Status: DISCONTINUED | OUTPATIENT
Start: 2023-10-09 | End: 2023-10-09 | Stop reason: HOSPADM

## 2023-10-09 RX ORDER — METHYLERGONOVINE MALEATE 0.2 MG/ML
0.2 INJECTION INTRAVENOUS
Status: DISCONTINUED | OUTPATIENT
Start: 2023-10-09 | End: 2023-10-10 | Stop reason: HOSPADM

## 2023-10-09 RX ORDER — CITRIC ACID/SODIUM CITRATE 334-500MG
30 SOLUTION, ORAL ORAL EVERY 6 HOURS PRN
Status: DISCONTINUED | OUTPATIENT
Start: 2023-10-09 | End: 2023-10-09 | Stop reason: HOSPADM

## 2023-10-09 RX ORDER — ONDANSETRON 4 MG/1
4 TABLET, ORALLY DISINTEGRATING ORAL EVERY 6 HOURS PRN
Status: DISCONTINUED | OUTPATIENT
Start: 2023-10-09 | End: 2023-10-09 | Stop reason: HOSPADM

## 2023-10-09 RX ORDER — ALUMINA, MAGNESIA, AND SIMETHICONE 2400; 2400; 240 MG/30ML; MG/30ML; MG/30ML
30 SUSPENSION ORAL EVERY 6 HOURS PRN
Status: DISCONTINUED | OUTPATIENT
Start: 2023-10-09 | End: 2023-10-09 | Stop reason: HOSPADM

## 2023-10-09 RX ORDER — OXYCODONE HYDROCHLORIDE 5 MG/1
5 TABLET ORAL EVERY 4 HOURS PRN
Status: DISCONTINUED | OUTPATIENT
Start: 2023-10-09 | End: 2023-10-10 | Stop reason: HOSPADM

## 2023-10-09 RX ORDER — VITAMIN A ACETATE, BETA CAROTENE, ASCORBIC ACID, CHOLECALCIFEROL, .ALPHA.-TOCOPHEROL ACETATE, DL-, THIAMINE MONONITRATE, RIBOFLAVIN, NIACINAMIDE, PYRIDOXINE HYDROCHLORIDE, FOLIC ACID, CYANOCOBALAMIN, CALCIUM CARBONATE, FERROUS FUMARATE, ZINC OXIDE, CUPRIC OXIDE 3080; 12; 120; 400; 1; 1.84; 3; 20; 22; 920; 25; 200; 27; 10; 2 [IU]/1; UG/1; MG/1; [IU]/1; MG/1; MG/1; MG/1; MG/1; MG/1; [IU]/1; MG/1; MG/1; MG/1; MG/1; MG/1
1 TABLET, FILM COATED ORAL
Status: DISCONTINUED | OUTPATIENT
Start: 2023-10-09 | End: 2023-10-10 | Stop reason: HOSPADM

## 2023-10-09 RX ORDER — CALCIUM CARBONATE 500 MG/1
1000 TABLET, CHEWABLE ORAL EVERY 6 HOURS PRN
Status: DISCONTINUED | OUTPATIENT
Start: 2023-10-09 | End: 2023-10-10 | Stop reason: HOSPADM

## 2023-10-09 RX ORDER — SODIUM CHLORIDE, SODIUM LACTATE, POTASSIUM CHLORIDE, CALCIUM CHLORIDE 600; 310; 30; 20 MG/100ML; MG/100ML; MG/100ML; MG/100ML
1000 INJECTION, SOLUTION INTRAVENOUS CONTINUOUS
Status: ACTIVE | OUTPATIENT
Start: 2023-10-09 | End: 2023-10-09

## 2023-10-09 RX ORDER — IBUPROFEN 800 MG/1
800 TABLET ORAL
Status: DISCONTINUED | OUTPATIENT
Start: 2023-10-09 | End: 2023-10-09 | Stop reason: HOSPADM

## 2023-10-09 RX ADMIN — SODIUM CHLORIDE 5 MILLION UNITS: 900 INJECTION INTRAVENOUS at 04:52

## 2023-10-09 RX ADMIN — OXYTOCIN 125 ML/HR: 10 INJECTION, SOLUTION INTRAMUSCULAR; INTRAVENOUS at 08:03

## 2023-10-09 RX ADMIN — OXYTOCIN 20 UNITS: 10 INJECTION, SOLUTION INTRAMUSCULAR; INTRAVENOUS at 05:20

## 2023-10-09 RX ADMIN — SODIUM CHLORIDE, POTASSIUM CHLORIDE, SODIUM LACTATE AND CALCIUM CHLORIDE 1000 ML: 600; 310; 30; 20 INJECTION, SOLUTION INTRAVENOUS at 04:49

## 2023-10-09 ASSESSMENT — PATIENT HEALTH QUESTIONNAIRE - PHQ9
SUM OF ALL RESPONSES TO PHQ9 QUESTIONS 1 AND 2: 0
1. LITTLE INTEREST OR PLEASURE IN DOING THINGS: NOT AT ALL
2. FEELING DOWN, DEPRESSED, IRRITABLE, OR HOPELESS: NOT AT ALL

## 2023-10-09 ASSESSMENT — PAIN DESCRIPTION - PAIN TYPE
TYPE: ACUTE PAIN

## 2023-10-09 ASSESSMENT — LIFESTYLE VARIABLES
ALCOHOL_USE: NO
EVER_SMOKED: NEVER

## 2023-10-09 NOTE — CARE PLAN
Problem: Risk for Injury  Goal: Patient and fetus will be free of preventable injury/complications  Outcome: Progressing  Note: C-efm in place, rn continually assessing FHT and uterine activity      Problem: Pain  Goal: Patient's pain will be alleviated or reduced to the patient’s comfort goal  Outcome: Progressing  Note: Pt coping w/ labor pain using non-pharmacologic techniques. Pt utilizing breathing techniques, counter pressure, and emotional support.    The patient is Stable - Low risk of patient condition declining or worsening    Shift Goals  Clinical Goals: Normal spontaneous vaginal delivery  Patient Goals: Non-pharmacological pain management  Family Goals: Support patient    Progress made toward(s) clinical / shift goals:  pt progressed as expected. Viable baby delivered w/ 8/9 APGARS. Pt able to cope well throughout labor and delivery process.    Patient is not progressing towards the following goals:

## 2023-10-09 NOTE — H&P
DATE OF ADMISSION:  10/09/2023     CHIEF COMPLAINTS:    1.  A 39 and 3/7th week gestation.  2.  Labor.  3.  Group B strep positive.  4.  Diet controlled gestational diabetes mellitus.  5.  Meconium.     HISTORY OF PRESENT ILLNESS:  The patient is a 32-year-old lady,  3,   para 2.  Her JESUS is 10/13/2023, so her EGA is 39 and 3/7th weeks.  She   presents with 6 hours of increasingly painful contractions; according to the   nurse, she is 9 cm dilated.  Her membranes just ruptured.  Meconium was noted.    Fetal monitoring is reassuring she is group B strep positive.  She is   Presently receiving a dose of penicillin G for group B strep prophylaxis.      PRENATAL CARE: remarkable for diet-controlled gestational diabetes mellitus and group B   strep positivity.     OBSTETRIC HISTORY:  Two term spontaneous vaginal deliveries.     PAST MEDICAL HISTORY:  Unremarkable.     PAST SURGICAL HISTORY:  Tonsillectomy.     ALLERGIES:  No known drug allergies.     MEDICATIONS:  Prenatal vitamin daily.     PHYSICAL EXAMINATION:    VITAL SIGNS:  Temperature 97.5, pulse 80, respiration 18, /56.  HEENT:  Normal.  LUNGS:  Clear to auscultation.  HEART:  Sounds normal.  ABDOMEN:  Nontender.  Fundal height is appropriate.  PELVIC: cervix 9 cm dilated per RN, cephalic presentation, meconium seen  EXTREMITIES:  No edema.  Homans' negative.  DTRs normal.     DIAGNOSES:    1.  A 39 and 3/7th week gestation.  2.  Labor.  3.  Group B streptococcus positive.  4.  Diet controlled gestational diabetes mellitus.  5.  Meconium.     PLAN:  Admit, penicillin G group B strep prophylaxis, pursue delivery.           ______________________________  MD SHELLY Murphy/CRYS    DD:  10/09/2023 05:05  DT:  10/09/2023 05:50    Job#:  [id number]    CC:BILLY BENJAMIN MD

## 2023-10-09 NOTE — L&D DELIVERY NOTE
DATE OF SERVICE:  10/09/2023     PROCEDURE:   Spontaneous vaginal delivery.     OBSTETRICIAN:  Alfredo Desai MD     DIAGNOSES:    1.  A 39 and 3/7th week gestation, delivered.  2.  Labor.  3.  Group B streptococcus positive.  4.  Diet controlled gestational diabetes mellitus.  5.  Meconium.     COMPLICATIONS:  None.     ESTIMATED BLOOD LOSS:  200 mL.     BABY:  Female, 1 minute Apgar 8, 5 minute Apgar 9.  Weight 3220 grams     BRIEF HISTORY:  This 32-year-old lady is now  3, para 3.  She was   admitted with spontaneous labor and intact membranes, 7 cm dilated.  She was   known to be group B strep positive, so we promptly administered a single dose   of IV penicillin G.  Her membranes ruptured spontaneously at 9 cm dilatation   revealing meconium-stained fluid.  She progressed rapidly.  She only pushed   one time.  She pushed the baby out occiput anterior in a controlled fashion   with no episiotomy.  I bulb suctioned the baby's mouth.  There were no nuchal   cords.  The shoulders and body delivered easily.  I placed the baby on her   chest and 3 minutes later, the cord was doubly clamped and cut.  The intact   placenta and all trailing membranes delivered spontaneously in a timely   fashion.  There was a 3-vessel cord with central insertion.  There were no   lacerations.  Blood loss was minimal.  Uterine tone is excellent.  The single   dose of penicillin G was completed just prior to delivery.           ______________________________  MD BROWN MurphyF/CRYS    DD:  10/09/2023 05:35  DT:  10/09/2023 05:57    Job#:  [id number]    CC:BILLY BENJAMIN MD

## 2023-10-09 NOTE — L&D DELIVERY NOTE
(dictated)    Very short 2nd stage  Baby: female, APGARs 8-9, not weighed yet  No nuchal cords  Plac spont,  cc  No epis, no lac    One dose IV Pen G completed just before

## 2023-10-09 NOTE — PROGRESS NOTES
Patient arrived in room 301 from L&D.  Report received from Henrietta Can RN.  Oriented patient on safety information, infant identification, bands and cuddles verified.

## 2023-10-09 NOTE — LACTATION NOTE
This note was copied from a baby's chart.  Met with mother for an initial lactation consultation. She has a medical history of GDM, no other significant medical or surgical history documented.     This is her third child. She reports that she breast fed her other two children. With her last baby, she breast fed and had to stop for a while due to a painful latch, but she restarted and continued to breast feed.     She reports that this baby has latched well since delivery. She reports that the latch is comfortable. She declined latch assistance at this time.     Breast feeding education provided: Education provided regarding the milk making process and supply in demand. Frequent skin to skin encouraged. Encouraged MOB to offer the breast to baby any time she is showing hunger cues (cues reviewed). Encouraged MOB not to limit baby's time at the breast. Encouraged her to reach out to her RN/LC for assistance with latching.     Plan: Continue offering the breast to baby on cue, a minimum of 8 times every 24hrs. Skin to skin for each feeding. Hand expression and feed back colostrum (with RN assistance) if baby due to feed, but too sleepy or unable to latch. Do not limit baby's time at the breast. Reach out to RN/LC if experiencing pain with latch or if unable to latch baby independently.

## 2023-10-09 NOTE — PROGRESS NOTES
0700- report received from Chacho ALONZO, POC discussed  0730- pt assisted to bathroom, steady on feet. Pt voids, galileo care done, gown changed.  0825- pt transferred to postpartum, report to Rachael ALONZO, POC discussed.

## 2023-10-09 NOTE — PROGRESS NOTES
"0426  at 39+3 presents to L+D w/ c/o uc's. Denies LOF or vaginal bleeding and endorses + FM. Monitors applied.     3643 report called to Dr Desai, admit orders received. Admission procedures initiated    518 viable baby girl \"Daisy\" delivered via Dr Desai    7930 report given to Rowena ALONZO, care relinquished   "

## 2023-10-09 NOTE — CARE PLAN
Problem: Knowledge Deficit - Postpartum  Goal: Patient will verbalize and demonstrate understanding of self and infant care  Outcome: Progressing     Problem: Psychosocial - Postpartum  Goal: Patient will verbalize and demonstrate effective bonding and parenting behavior  Outcome: Progressing   The patient is Stable - Low risk of patient condition declining or worsening    Shift Goals  Clinical Goals: Normal spontaneous vaginal delivery  Patient Goals: Non-pharmacological pain management  Family Goals: Support patient    Progress made toward(s) clinical / shift goals:  Patient is bonding with .    Patient is not progressing towards the following goals:

## 2023-10-10 VITALS
WEIGHT: 144 LBS | SYSTOLIC BLOOD PRESSURE: 101 MMHG | DIASTOLIC BLOOD PRESSURE: 65 MMHG | RESPIRATION RATE: 16 BRPM | HEIGHT: 60 IN | BODY MASS INDEX: 28.27 KG/M2 | HEART RATE: 64 BPM | OXYGEN SATURATION: 96 % | TEMPERATURE: 97.9 F

## 2023-10-10 PROCEDURE — 700102 HCHG RX REV CODE 250 W/ 637 OVERRIDE(OP): Performed by: OBSTETRICS & GYNECOLOGY

## 2023-10-10 PROCEDURE — A9270 NON-COVERED ITEM OR SERVICE: HCPCS | Performed by: OBSTETRICS & GYNECOLOGY

## 2023-10-10 RX ADMIN — PRENATAL WITH FERROUS FUM AND FOLIC ACID 1 TABLET: 3080; 920; 120; 400; 22; 1.84; 3; 20; 10; 1; 12; 200; 27; 25; 2 TABLET ORAL at 07:48

## 2023-10-10 ASSESSMENT — EDINBURGH POSTNATAL DEPRESSION SCALE (EPDS)
I HAVE BEEN ABLE TO LAUGH AND SEE THE FUNNY SIDE OF THINGS: AS MUCH AS I ALWAYS COULD
I HAVE FELT SCARED OR PANICKY FOR NO GOOD REASON: NO, NOT AT ALL
I HAVE BLAMED MYSELF UNNECESSARILY WHEN THINGS WENT WRONG: NOT VERY OFTEN
I HAVE BEEN ANXIOUS OR WORRIED FOR NO GOOD REASON: NO, NOT AT ALL
I HAVE BEEN SO UNHAPPY THAT I HAVE HAD DIFFICULTY SLEEPING: NOT AT ALL
I HAVE BEEN SO UNHAPPY THAT I HAVE BEEN CRYING: NO, NEVER
I HAVE LOOKED FORWARD WITH ENJOYMENT TO THINGS: AS MUCH AS I EVER DID
I HAVE FELT SAD OR MISERABLE: NO, NOT AT ALL
THINGS HAVE BEEN GETTING ON TOP OF ME: NO, I HAVE BEEN COPING AS WELL AS EVER
THE THOUGHT OF HARMING MYSELF HAS OCCURRED TO ME: NEVER

## 2023-10-10 NOTE — PROGRESS NOTES
0745- patient assessment done.  Condition will continue to be monitored.     1130- patient states that she understands all discharge instructions and has no questions at this time.

## 2023-10-10 NOTE — CARE PLAN
The patient is Stable - Low risk of patient condition declining or worsening    Shift Goals  Clinical Goals: VSS,light lochia  Patient Goals: rest,breast feed  Family Goals: rest,breast feed    Progress made toward(s) clinical / shift goals:  progressing    Patient is not progressing towards the following goals:

## 2023-10-10 NOTE — LACTATION NOTE
Follow up lactation visit:    Met with Henrietta and lisseth Villa to provide follow-up lactation support. Henrietta reports that Daisy has been cluster feeding over night. She notes some nipple tenderness following extended feedings; the right nipple is reddened, though still intact. Henrietta reports the left nipple to be intact, as well. She has been applying nipple cream to treat tenderness. We reviewed the importance of maintaining a deep latch for the full duration of feedings to prevent nipple damage.     Encouraged Henrietta to call for latch assistance with next feeding session, if desired.    Feeding Plan:    Continue with cue-based breastfeeding, at least once every three hours, for a total of 8+ feeds per 24 hours.    [facility] Breastfeeding Resource list provided, and patient encouraged to follow up with [facility] for outpatient lactation care (referral placed).

## 2023-10-10 NOTE — PROGRESS NOTES
PP day #1    S:  Pt doing well.  Minimal lochia.  Breast feeding.  No problems voiding.  Wants to go home if baby released. OTC Motrin for pain    O: T 97.9 P 64  /65  ABD: soft, NT, ff below umb  EXT: Trace pedal edema, no cords or Homans  H/H   O+  GBS +    A/P;  PP day #1 S/P  at term , GBS +  - doing well   GBS +  Pt received one dose of PCN prior to delivery.  Would like to go home if baby released  F/U Dr. Weller 6 weeks  Pelvic  rest for 6 weeks  OTC Motrin and PNV

## 2023-11-13 ENCOUNTER — APPOINTMENT (OUTPATIENT)
Dept: OBGYN | Facility: CLINIC | Age: 33
End: 2023-11-13
Payer: COMMERCIAL